# Patient Record
Sex: FEMALE
[De-identification: names, ages, dates, MRNs, and addresses within clinical notes are randomized per-mention and may not be internally consistent; named-entity substitution may affect disease eponyms.]

---

## 2024-09-12 ENCOUNTER — NON-APPOINTMENT (OUTPATIENT)
Age: 39
End: 2024-09-12

## 2024-09-17 PROBLEM — Z00.00 ENCOUNTER FOR PREVENTIVE HEALTH EXAMINATION: Status: ACTIVE | Noted: 2024-09-17

## 2024-09-18 ENCOUNTER — APPOINTMENT (OUTPATIENT)
Dept: PULMONOLOGY | Facility: CLINIC | Age: 39
End: 2024-09-18

## 2024-10-03 ENCOUNTER — NON-APPOINTMENT (OUTPATIENT)
Age: 39
End: 2024-10-03

## 2024-10-04 ENCOUNTER — APPOINTMENT (OUTPATIENT)
Dept: PULMONOLOGY | Facility: CLINIC | Age: 39
End: 2024-10-04
Payer: COMMERCIAL

## 2024-10-04 ENCOUNTER — NON-APPOINTMENT (OUTPATIENT)
Age: 39
End: 2024-10-04

## 2024-10-04 VITALS
SYSTOLIC BLOOD PRESSURE: 115 MMHG | HEART RATE: 69 BPM | DIASTOLIC BLOOD PRESSURE: 81 MMHG | WEIGHT: 138 LBS | HEIGHT: 66 IN | TEMPERATURE: 98.8 F | OXYGEN SATURATION: 97 % | BODY MASS INDEX: 22.18 KG/M2 | RESPIRATION RATE: 12 BRPM

## 2024-10-04 DIAGNOSIS — R06.00 DYSPNEA, UNSPECIFIED: ICD-10-CM

## 2024-10-04 DIAGNOSIS — R05.3 CHRONIC COUGH: ICD-10-CM

## 2024-10-04 DIAGNOSIS — K21.9 GASTRO-ESOPHAGEAL REFLUX DISEASE W/OUT ESOPHAGITIS: ICD-10-CM

## 2024-10-04 PROCEDURE — 99204 OFFICE O/P NEW MOD 45 MIN: CPT

## 2024-10-04 RX ORDER — FAMOTIDINE 40 MG/1
40 TABLET, FILM COATED ORAL
Qty: 30 | Refills: 5 | Status: DISCONTINUED | COMMUNITY
Start: 2024-10-04 | End: 2024-10-04

## 2024-10-04 RX ORDER — FLUTICASONE PROPIONATE 50 UG/1
50 SPRAY, METERED NASAL TWICE DAILY
Qty: 1 | Refills: 6 | Status: ACTIVE | COMMUNITY
Start: 2024-10-04 | End: 1900-01-01

## 2024-10-04 RX ORDER — FAMOTIDINE 20 MG/1
20 TABLET, FILM COATED ORAL
Qty: 30 | Refills: 6 | Status: ACTIVE | COMMUNITY
Start: 2024-10-04 | End: 1900-01-01

## 2024-10-31 ENCOUNTER — NON-APPOINTMENT (OUTPATIENT)
Age: 39
End: 2024-10-31

## 2024-11-11 ENCOUNTER — TRANSCRIPTION ENCOUNTER (OUTPATIENT)
Age: 39
End: 2024-11-11

## 2024-11-11 ENCOUNTER — APPOINTMENT (OUTPATIENT)
Dept: SURGERY | Facility: CLINIC | Age: 39
End: 2024-11-11
Payer: COMMERCIAL

## 2024-11-11 VITALS
OXYGEN SATURATION: 98 % | TEMPERATURE: 98.1 F | HEIGHT: 66 IN | HEART RATE: 72 BPM | WEIGHT: 137 LBS | DIASTOLIC BLOOD PRESSURE: 79 MMHG | SYSTOLIC BLOOD PRESSURE: 115 MMHG | BODY MASS INDEX: 22.02 KG/M2

## 2024-11-11 DIAGNOSIS — Z86.59 PERSONAL HISTORY OF OTHER MENTAL AND BEHAVIORAL DISORDERS: ICD-10-CM

## 2024-11-11 DIAGNOSIS — K80.10 CALCULUS OF GALLBLADDER WITH CHRONIC CHOLECYSTITIS W/OUT OBSTRUCTION: ICD-10-CM

## 2024-11-11 DIAGNOSIS — N13.30 UNSPECIFIED HYDRONEPHROSIS: ICD-10-CM

## 2024-11-11 PROCEDURE — G2211 COMPLEX E/M VISIT ADD ON: CPT | Mod: NC

## 2024-11-11 PROCEDURE — 99204 OFFICE O/P NEW MOD 45 MIN: CPT

## 2024-11-11 RX ORDER — SPIRONOLACTONE 100 MG/1
100 TABLET ORAL
Qty: 30 | Refills: 0 | Status: ACTIVE | COMMUNITY
Start: 2024-10-08

## 2024-11-18 ENCOUNTER — APPOINTMENT (OUTPATIENT)
Dept: PULMONOLOGY | Facility: CLINIC | Age: 39
End: 2024-11-18

## 2024-12-04 DIAGNOSIS — Z01.812 ENCOUNTER FOR PREPROCEDURAL LABORATORY EXAMINATION: ICD-10-CM

## 2024-12-06 ENCOUNTER — APPOINTMENT (OUTPATIENT)
Dept: FAMILY MEDICINE | Facility: CLINIC | Age: 39
End: 2024-12-06
Payer: COMMERCIAL

## 2024-12-06 VITALS
WEIGHT: 137 LBS | HEART RATE: 69 BPM | HEIGHT: 66 IN | DIASTOLIC BLOOD PRESSURE: 80 MMHG | OXYGEN SATURATION: 96 % | BODY MASS INDEX: 22.02 KG/M2 | SYSTOLIC BLOOD PRESSURE: 110 MMHG | TEMPERATURE: 98.5 F

## 2024-12-06 DIAGNOSIS — K80.10 CALCULUS OF GALLBLADDER WITH CHRONIC CHOLECYSTITIS W/OUT OBSTRUCTION: ICD-10-CM

## 2024-12-06 DIAGNOSIS — K21.9 GASTRO-ESOPHAGEAL REFLUX DISEASE W/OUT ESOPHAGITIS: ICD-10-CM

## 2024-12-06 DIAGNOSIS — Z87.898 PERSONAL HISTORY OF OTHER SPECIFIED CONDITIONS: ICD-10-CM

## 2024-12-06 PROCEDURE — 99204 OFFICE O/P NEW MOD 45 MIN: CPT

## 2024-12-06 PROCEDURE — 36415 COLL VENOUS BLD VENIPUNCTURE: CPT

## 2024-12-06 PROCEDURE — 93000 ELECTROCARDIOGRAM COMPLETE: CPT

## 2024-12-06 PROCEDURE — G2211 COMPLEX E/M VISIT ADD ON: CPT | Mod: NC

## 2024-12-10 ENCOUNTER — NON-APPOINTMENT (OUTPATIENT)
Age: 39
End: 2024-12-10

## 2024-12-10 LAB
ALBUMIN SERPL ELPH-MCNC: 4.6 G/DL
ALP BLD-CCNC: 60 U/L
ALT SERPL-CCNC: 10 U/L
ANION GAP SERPL CALC-SCNC: 12 MMOL/L
APPEARANCE: CLEAR
APTT BLD: 31.5 SEC
AST SERPL-CCNC: 17 U/L
BILIRUB SERPL-MCNC: 0.6 MG/DL
BILIRUBIN URINE: NEGATIVE
BLOOD URINE: NEGATIVE
BUN SERPL-MCNC: 11 MG/DL
CALCIUM SERPL-MCNC: 9.8 MG/DL
CHLORIDE SERPL-SCNC: 102 MMOL/L
CO2 SERPL-SCNC: 25 MMOL/L
COLOR: YELLOW
CREAT SERPL-MCNC: 0.68 MG/DL
EGFR: 114 ML/MIN/1.73M2
ESTIMATED AVERAGE GLUCOSE: 100 MG/DL
GLUCOSE QUALITATIVE U: NEGATIVE MG/DL
GLUCOSE SERPL-MCNC: 83 MG/DL
HBA1C MFR BLD HPLC: 5.1 %
HCG SERPL-MCNC: <1 MIU/ML
HCT VFR BLD CALC: 38.6 %
HGB BLD-MCNC: 12.2 G/DL
INR PPP: 1 RATIO
KETONES URINE: NEGATIVE MG/DL
LEUKOCYTE ESTERASE URINE: NEGATIVE
MCHC RBC-ENTMCNC: 30.9 PG
MCHC RBC-ENTMCNC: 31.6 G/DL
MCV RBC AUTO: 97.7 FL
NITRITE URINE: NEGATIVE
PH URINE: 7
PLATELET # BLD AUTO: 298 K/UL
POTASSIUM SERPL-SCNC: 4.6 MMOL/L
PROT SERPL-MCNC: 7.4 G/DL
PROTEIN URINE: NEGATIVE MG/DL
PT BLD: 11.9 SEC
RBC # BLD: 3.95 M/UL
RBC # FLD: 12.9 %
SODIUM SERPL-SCNC: 139 MMOL/L
SPECIFIC GRAVITY URINE: 1.01
UROBILINOGEN URINE: 0.2 MG/DL
WBC # FLD AUTO: 6.08 K/UL

## 2024-12-16 ENCOUNTER — NON-APPOINTMENT (OUTPATIENT)
Age: 39
End: 2024-12-16

## 2024-12-16 RX ORDER — CHLORHEXIDINE GLUCONATE 1.2 MG/ML
1 RINSE ORAL DAILY
Refills: 0 | Status: DISCONTINUED | OUTPATIENT
Start: 2024-12-17 | End: 2024-12-17

## 2024-12-16 NOTE — ASU PATIENT PROFILE, ADULT - NS PREOP UNDERSTANDS INFO
yes NO solid food for 8 hours before surgery/ no chewing gums or hard candy while fasting/ wear loose fitting comfortable clothes/yes

## 2024-12-16 NOTE — ASU PATIENT PROFILE, ADULT - ACCEPTABLE
Subjective   Patient ID: Connor is a 77 year old male.    Patient Care Team:  Aura Novoa DO as PCP - General (Family Practice)    Current listed medical conditions/medical history:  Patient Active Problem List   Diagnosis   • Hyperlipidemia   • DM (diabetes mellitus) (CMD)   • Meningioma (CMD)   • Moderate major depression (CMD)   • Benign prostatic hyperplasia   • Essential hypertension     SUBJECTIVE:  Chief Complaint   Patient presents with   • Follow-up     Connor is a 77 year old male with history of diabetes, dyslipidemia, BPH, hypertension, chronic constipation, arthritis, depression, meningioma, who presents for MWV with RN and follow up on his chronic conditions.    He is on metformin 1000 mg BID and glipizide 5 mg daily for his diabetes. He is scheduled to see ophthalmology next month (Dr. Siegle).      He is on lipitor 40 mg daily for cholesterol.    He is on flomax for BPH.     He is on amlodipine 10 mg daily, coreg 25 mg BID, lisinopril 40 mg daily for blood pressure. He was recently increased on Lisinopril 40 mg daily for better blood pressure control. He admits to checking his BP at home and it ranges 130s systolic.    He was seen in the clinic on 11/6 for allergic reaction s/p shampoo. He was given IM kenalog and clobetasol ointment. He states clobetasol was not covered by insurance. He has noticed a significant improvement to his rash. He does have occasion itching on his hands bilaterally.     He was diagnosed with meningioma when in the ED on 10/21 and was advised to see neurosurgery/neurosugeon. He continues to have headaches and neck pain but it's improved. He is scheduled to see neuro on 11/14.    He was given referral to ENT for cough and sore throat/burning sensation. He was advised to continue protonix for possible GERD. He stated insurance did not cover medication last time.    Relationships:      History obtained from: patient and electronic medical record    No LMP for male  patient.    Recent PHQ 2/9 Score    PHQ 2:  PHQ 2 Score Adult PHQ 2 Score Adult PHQ 2 Interpretation Little interest or pleasure in activity?   12/9/2022   4:37 PM 5 Further screening needed 2       PHQ 9:  PHQ 9 Score Adult PHQ 9 Score Adult PHQ 9 Interpretation   12/9/2022   4:37 PM 5 Mild Depression     Last four GAD7 Assessments       No data to display                Current Outpatient Medications   Medication Sig Dispense Refill   • pantoprazole (PROTONIX) 40 MG tablet Take 1 tablet by mouth daily. 90 tablet 1   • triamcinolone (ARISTOCORT) 0.1 % ointment Apply 1 application. topically in the morning and 1 application. in the evening. 30 g 0   • ondansetron (ZOFRAN ODT) 4 MG disintegrating tablet Place 1 tablet onto the tongue every 8 hours as needed for Nausea. 30 tablet 0   • clobetasol (TEMOVATE) 0.05 % ointment Apply topically 2 times daily. 60 g 0   • lisinopril (ZESTRIL) 40 MG tablet Take 1 tablet by mouth daily. 90 tablet 1   • Blood Glucose Monitoring Suppl w/Device Kit Please provide glucometer that is covered by her insurance. 1 kit 1   • Lancets 28G Misc Patient to use once daily. 100 each 3   • blood glucose test strip Test blood sugar daily. Diagnosis: DMT2. Please provide strips compatible with glucometer. 300 strip 1   • Respiratory Therapy Supplies (Nebulizer/Tubing/Mouthpiece) Kit Use once daily as needed 1 kit 1   • albuterol 108 (90 Base) MCG/ACT inhaler Inhale 2 puffs into the lungs every 4 hours as needed for Shortness of Breath or Wheezing. 1 each 0   • albuterol (ACCUNEB) 1.25 MG/3ML nebulizer solution Take 3 mLs by nebulization every 6 hours as needed for Wheezing or Shortness of Breath. 75 mL 1   • azithromycin (Zithromax Z-Vlad) 250 MG tablet Take 2 tablets (500 mg) by mouth x 1 day then 1 tablet (250 mg) by mouth daily x 4 days. 6 tablet 0   • polyethylene glycol (MIRALAX) 17 g packet Take 17 g by mouth daily. Stir and dissolve powder in any 4 to 8 ounces of beverage, then drink. 10  packet 0   • OneTouch Delica Lancets 33G Misc Use lancet twice daily to check glucose level. 300 each 0   • blood glucose test strip by Other route 3 times daily. Test blood sugar 3 times daily. Diagnosis: E11.9 . Meter: OneTouch Delica 100 each 1   • amLODIPine (NORVASC) 10 MG tablet Take 1 tablet by mouth daily. 90 tablet 0   • atorvastatin (LIPITOR) 40 MG tablet Take 1 tablet by mouth daily. 90 tablet 0   • carvedilol (COREG) 25 MG tablet Take 1 tablet by mouth in the morning and 1 tablet in the evening. 180 tablet 0   • glipiZIDE (GLUCOTROL XL) 5 MG 24 hr tablet Take 1 tablet by mouth daily. 90 tablet 0   • linaclotide (Linzess) 290 MCG Take 1 capsule by mouth daily (before breakfast). 90 capsule 0   • metformin (GLUCOPHAGE) 1000 MG tablet Take 1 tablet by mouth in the morning and 1 tablet in the evening. Take with meals. 180 tablet 0   • repaglinide (PRANDIN) 0.5 MG tablet Take 1 tablet by mouth in the morning and 1 tablet at noon and 1 tablet in the evening. Take before meals. 270 tablet 0   • tamsulosin (FLOMAX) 0.4 MG Cap Take 1 capsule by mouth daily. 90 capsule 0   • Blood Glucose Monitoring Suppl (OneTouch Verio) w/Device Kit 1 each  in the morning and 1 each in the evening. 1 kit 0   • typhoid (Typhim VI) 25 MCG/0.5ML injectable solution Typhim VI 25 mcg/0.5 mL intramuscular syringe   ADMINISTER VACCINE PER PHYSICIAN PROTOCOL       No current facility-administered medications for this visit.     Patient's medications, allergies, past medical, surgical, social and family histories were reviewed and updated as appropriate.  Past Medical History:   Diagnosis Date   • Arthritis    • Constipation    • Diabetes mellitus (CMD)    • Enlarged prostate    • High cholesterol      Past Surgical History:   Procedure Laterality Date   • Cardiac surgery  2016   • Esophagogastroduodenoscopy transoral flex w/bx single or mult  2021     Social History     Socioeconomic History   • Marital status: Single     Spouse name:  Not on file   • Number of children: Not on file   • Years of education: Not on file   • Highest education level: Not on file   Occupational History   • Not on file   Tobacco Use   • Smoking status: Former     Current packs/day: 0.00     Types: Cigarettes     Start date:      Quit date:      Years since quittin.8   • Smokeless tobacco: Never   Vaping Use   • Vaping Use: never used   Substance and Sexual Activity   • Alcohol use: Never   • Drug use: Never   • Sexual activity: Not Currently   Other Topics Concern   • Not on file   Social History Narrative   • Not on file     Social Determinants of Health     Financial Resource Strain: Not on file   Food Insecurity: Not on file   Transportation Needs: Not on file   Physical Activity: Not on file   Stress: Not on file   Social Connections: Not on file   Intimate Partner Violence: Not on file     Family History   Problem Relation Age of Onset   • Diabetes Mother    • Aneurysm Mother    • Patient is unaware of any medical problems Father    • Patient is unaware of any medical problems Sister    • Patient is unaware of any medical problems Sister    • Patient is unaware of any medical problems Sister    • Patient is unaware of any medical problems Brother    • Patient is unaware of any medical problems Brother    • Cancer Brother         brain   • Patient is unaware of any medical problems Brother    • Patient is unaware of any medical problems Brother    • Diabetes Brother    • Hypertension Brother    • Patient is unaware of any medical problems Maternal Grandmother    • Patient is unaware of any medical problems Maternal Grandfather    • Patient is unaware of any medical problems Paternal Grandmother    • Patient is unaware of any medical problems Paternal Grandfather    • Patient is unaware of any medical problems Daughter    • Patient is unaware of any medical problems Son       Health Maintenance Summary     Diabetes Eye Exam (Yearly)  Ordered on  11/10/2023    Hepatitis C Screening (Once)  Ordered on 11/10/2023    Medicare Advantage- Medicare Wellness Visit (Yearly - January to December)  Overdue since 1/1/2023    Diabetes A1C (Every 6 Months)  Next due on 4/25/2024    DM/CKD GFR (Yearly)  Next due on 10/21/2024    Diabetes Foot Exam (Yearly)  Next due on 10/25/2024    DTaP/Tdap/Td Vaccine (2 - Td or Tdap)  Next due on 2/11/2028    Shingles Vaccine   Completed    Pneumococcal Vaccine 65+   Completed    Meningococcal Vaccine   Aged Out    Influenza Vaccine   Completed    COVID-19 Vaccine   Completed    Hepatitis B Vaccine (For Physician/APC Discussion)   Aged Out    HPV Vaccine   Aged Out        ROS:  CONSTITUTIONAL: Denies fever  EYES: Denies double vision  ENT: +sore throat  CV: Denies chest pain  RESPIRATORY: +night-time cough  SKIN: rash- improving    OBJECTIVE:  Vitals:    11/10/23 1026   BP: 100/50   Pulse: 70   Resp: 18   Temp: 97.9 °F (36.6 °C)   TempSrc: Temporal   SpO2: 96%   Weight: 81.6 kg (180 lb)   Height: 6' (1.829 m)   PainSc: 5    PainLoc: Throat     Physical Exam:  GENERAL: awake, alert and oriented, no acute distress.  HEENT: normocephalic, atraumatic, EOMI  CV: regular rate and rhythm, S1, S2  RESP: clear to auscultation bilaterally   SKIN: +erythema on bilateral hands with itching- improved  MSK/EXT: moves all 4 extremities   NEURO: no focal deficits  PSYCH: affect appropriate    ASSESSMENT/PLAN:  Connor was seen today for follow-up.  Diagnoses and all orders for this visit:    Essential hypertension  -Continue amlodipine 10 mg daily, coreg 25 mg BID, lisinopril 40 mg daily    Hyperlipidemia, unspecified hyperlipidemia type  -Continue lipitor 40 mg nightly    Type 2 diabetes mellitus without complication, without long-term current use of insulin (CMD)  -Continue metformin 1000 mg BID and glipizide 5 mg daily  -Hgb a1c 7.7 on 10/25/2023  -SERVICE TO OPHTHALMOLOGY- patient is scheduled with Dr. Siegle next month    Meningioma (CMD)  -He is  scheduled to see neuro on 11/14.    Benign prostatic hyperplasia, unspecified whether lower urinary tract symptoms present  -Continue flomax daily    Cough, unspecified type  Excessive gas  GERD  -     pantoprazole (PROTONIX) 40 MG tablet; Take 1 tablet by mouth daily.    Allergic reaction, subsequent encounter  -     triamcinolone (ARISTOCORT) 0.1 % ointment; Apply 1 application. topically in the morning and 1 application. in the evening.    I reviewed the diagnosis and plan with the patient, and all questions were answered.  The medication list was reviewed.  The patient understands to call the office for any new questions or concerns.    Aura Novoa DO  11/10/2023   11:08 AM    4

## 2024-12-17 ENCOUNTER — TRANSCRIPTION ENCOUNTER (OUTPATIENT)
Age: 39
End: 2024-12-17

## 2024-12-17 ENCOUNTER — APPOINTMENT (OUTPATIENT)
Dept: SURGERY | Facility: AMBULATORY SURGERY CENTER | Age: 39
End: 2024-12-17

## 2024-12-17 ENCOUNTER — OUTPATIENT (OUTPATIENT)
Dept: OUTPATIENT SERVICES | Facility: HOSPITAL | Age: 39
LOS: 1 days | Discharge: HOME | End: 2024-12-17
Payer: COMMERCIAL

## 2024-12-17 ENCOUNTER — RESULT REVIEW (OUTPATIENT)
Age: 39
End: 2024-12-17

## 2024-12-17 ENCOUNTER — INPATIENT (INPATIENT)
Facility: HOSPITAL | Age: 39
LOS: 0 days | Discharge: HOME HEALTH | End: 2024-12-18
Attending: SURGERY | Admitting: SURGERY
Payer: COMMERCIAL

## 2024-12-17 VITALS
OXYGEN SATURATION: 96 % | RESPIRATION RATE: 18 BRPM | DIASTOLIC BLOOD PRESSURE: 81 MMHG | SYSTOLIC BLOOD PRESSURE: 116 MMHG | TEMPERATURE: 98 F | HEART RATE: 71 BPM

## 2024-12-17 VITALS
HEIGHT: 66 IN | SYSTOLIC BLOOD PRESSURE: 108 MMHG | WEIGHT: 137.13 LBS | HEART RATE: 65 BPM | RESPIRATION RATE: 18 BRPM | TEMPERATURE: 98 F | OXYGEN SATURATION: 100 % | DIASTOLIC BLOOD PRESSURE: 65 MMHG

## 2024-12-17 VITALS
RESPIRATION RATE: 12 BRPM | DIASTOLIC BLOOD PRESSURE: 76 MMHG | SYSTOLIC BLOOD PRESSURE: 116 MMHG | HEART RATE: 69 BPM | OXYGEN SATURATION: 93 %

## 2024-12-17 DIAGNOSIS — Z98.890 OTHER SPECIFIED POSTPROCEDURAL STATES: Chronic | ICD-10-CM

## 2024-12-17 DIAGNOSIS — K42.9 UMBILICAL HERNIA W/OUT OBSTRUCTION OR GANGRENE: ICD-10-CM

## 2024-12-17 PROCEDURE — S2900 ROBOTIC SURGICAL SYSTEM: CPT | Mod: NC

## 2024-12-17 PROCEDURE — 47562 LAPAROSCOPIC CHOLECYSTECTOMY: CPT | Mod: AS

## 2024-12-17 PROCEDURE — 47562 LAPAROSCOPIC CHOLECYSTECTOMY: CPT

## 2024-12-17 PROCEDURE — 88304 TISSUE EXAM BY PATHOLOGIST: CPT | Mod: 26

## 2024-12-17 DEVICE — LIGATING CLIPS WECK HEMOLOK POLYMER MEDIUM-LARGE (GREEN) 6: Type: IMPLANTABLE DEVICE | Status: FUNCTIONAL

## 2024-12-17 RX ORDER — APREPITANT 40 MG/1
40 CAPSULE ORAL ONCE
Refills: 0 | Status: COMPLETED | OUTPATIENT
Start: 2024-12-17 | End: 2024-12-17

## 2024-12-17 RX ORDER — HYDROMORPHONE HYDROCHLORIDE 2 MG/1
0.25 TABLET ORAL EVERY 6 HOURS
Refills: 0 | Status: DISCONTINUED | OUTPATIENT
Start: 2024-12-17 | End: 2024-12-18

## 2024-12-17 RX ORDER — DOCUSATE SODIUM 100 MG
1 CAPSULE ORAL
Qty: 10 | Refills: 0
Start: 2024-12-17

## 2024-12-17 RX ORDER — ACETAMINOPHEN 500MG 500 MG/1
1000 TABLET, COATED ORAL ONCE
Refills: 0 | Status: COMPLETED | OUTPATIENT
Start: 2024-12-17 | End: 2024-12-17

## 2024-12-17 RX ORDER — OXYCODONE HYDROCHLORIDE 30 MG/1
5 TABLET ORAL EVERY 6 HOURS
Refills: 0 | Status: DISCONTINUED | OUTPATIENT
Start: 2024-12-17 | End: 2024-12-18

## 2024-12-17 RX ORDER — ONDANSETRON HYDROCHLORIDE 4 MG/1
4 TABLET, FILM COATED ORAL EVERY 6 HOURS
Refills: 0 | Status: DISCONTINUED | OUTPATIENT
Start: 2024-12-17 | End: 2024-12-18

## 2024-12-17 RX ORDER — METHOCARBAMOL 500 MG/1
750 TABLET, FILM COATED ORAL EVERY 6 HOURS
Refills: 0 | Status: DISCONTINUED | OUTPATIENT
Start: 2024-12-17 | End: 2024-12-18

## 2024-12-17 RX ORDER — KETOROLAC TROMETHAMINE 30 MG/ML
30 INJECTION INTRAMUSCULAR; INTRAVENOUS ONCE
Refills: 0 | Status: DISCONTINUED | OUTPATIENT
Start: 2024-12-17 | End: 2024-12-17

## 2024-12-17 RX ORDER — OXYCODONE HYDROCHLORIDE 30 MG/1
2.5 TABLET ORAL EVERY 6 HOURS
Refills: 0 | Status: DISCONTINUED | OUTPATIENT
Start: 2024-12-17 | End: 2024-12-18

## 2024-12-17 RX ORDER — OXYCODONE HYDROCHLORIDE 30 MG/1
5 TABLET ORAL ONCE
Refills: 0 | Status: DISCONTINUED | OUTPATIENT
Start: 2024-12-17 | End: 2024-12-17

## 2024-12-17 RX ORDER — DIAZEPAM 10 MG/1
2.5 TABLET ORAL ONCE
Refills: 0 | Status: DISCONTINUED | OUTPATIENT
Start: 2024-12-17 | End: 2024-12-17

## 2024-12-17 RX ORDER — HEPARIN SODIUM,PORCINE 1000/ML
5000 VIAL (ML) INJECTION EVERY 8 HOURS
Refills: 0 | Status: DISCONTINUED | OUTPATIENT
Start: 2024-12-17 | End: 2024-12-18

## 2024-12-17 RX ORDER — ONDANSETRON HYDROCHLORIDE 4 MG/1
4 TABLET, FILM COATED ORAL ONCE
Refills: 0 | Status: DISCONTINUED | OUTPATIENT
Start: 2024-12-17 | End: 2024-12-17

## 2024-12-17 RX ORDER — ACETAMINOPHEN 500MG 500 MG/1
1000 TABLET, COATED ORAL EVERY 6 HOURS
Refills: 0 | Status: DISCONTINUED | OUTPATIENT
Start: 2024-12-17 | End: 2024-12-18

## 2024-12-17 RX ORDER — KETOROLAC TROMETHAMINE 30 MG/ML
15 INJECTION INTRAMUSCULAR; INTRAVENOUS EVERY 6 HOURS
Refills: 0 | Status: DISCONTINUED | OUTPATIENT
Start: 2024-12-18 | End: 2024-12-18

## 2024-12-17 RX ORDER — HYDROMORPHONE HYDROCHLORIDE 2 MG/1
0.5 TABLET ORAL ONCE
Refills: 0 | Status: DISCONTINUED | OUTPATIENT
Start: 2024-12-17 | End: 2024-12-17

## 2024-12-17 RX ORDER — OXYCODONE HYDROCHLORIDE 30 MG/1
1 TABLET ORAL
Qty: 5 | Refills: 0
Start: 2024-12-17

## 2024-12-17 RX ORDER — 0.9 % SODIUM CHLORIDE 0.9 %
1000 INTRAVENOUS SOLUTION INTRAVENOUS
Refills: 0 | Status: DISCONTINUED | OUTPATIENT
Start: 2024-12-17 | End: 2024-12-17

## 2024-12-17 RX ADMIN — HYDROMORPHONE HYDROCHLORIDE 0.5 MILLIGRAM(S): 2 TABLET ORAL at 18:08

## 2024-12-17 RX ADMIN — DIAZEPAM 2.5 MILLIGRAM(S): 10 TABLET ORAL at 21:38

## 2024-12-17 RX ADMIN — KETOROLAC TROMETHAMINE 30 MILLIGRAM(S): 30 INJECTION INTRAMUSCULAR; INTRAVENOUS at 18:08

## 2024-12-17 RX ADMIN — KETOROLAC TROMETHAMINE 30 MILLIGRAM(S): 30 INJECTION INTRAMUSCULAR; INTRAVENOUS at 18:06

## 2024-12-17 RX ADMIN — ACETAMINOPHEN 500MG 1000 MILLIGRAM(S): 500 TABLET, COATED ORAL at 13:43

## 2024-12-17 RX ADMIN — OXYCODONE HYDROCHLORIDE 5 MILLIGRAM(S): 30 TABLET ORAL at 17:44

## 2024-12-17 RX ADMIN — METHOCARBAMOL 750 MILLIGRAM(S): 500 TABLET, FILM COATED ORAL at 23:36

## 2024-12-17 RX ADMIN — APREPITANT 40 MILLIGRAM(S): 40 CAPSULE ORAL at 13:43

## 2024-12-17 RX ADMIN — ACETAMINOPHEN 500MG 400 MILLIGRAM(S): 500 TABLET, COATED ORAL at 18:53

## 2024-12-17 RX ADMIN — HYDROMORPHONE HYDROCHLORIDE 0.5 MILLIGRAM(S): 2 TABLET ORAL at 17:25

## 2024-12-17 RX ADMIN — Medication 5000 UNIT(S): at 23:41

## 2024-12-17 RX ADMIN — ACETAMINOPHEN 500MG 1000 MILLIGRAM(S): 500 TABLET, COATED ORAL at 23:31

## 2024-12-17 RX ADMIN — OXYCODONE HYDROCHLORIDE 5 MILLIGRAM(S): 30 TABLET ORAL at 18:53

## 2024-12-17 NOTE — ASU DISCHARGE PLAN (ADULT/PEDIATRIC) - NS MD DC FALL RISK RISK
For information on Fall & Injury Prevention, visit: [email or web address] OR  [email or web address] OR  https://www.cdc.gov/steadi/patient.html

## 2024-12-17 NOTE — ASU DISCHARGE PLAN (ADULT/PEDIATRIC) - FINANCIAL ASSISTANCE
[facility] provides services at a reduced cost to those who are determined to be eligible through [facility]’s financial assistance program. Information regarding [facility]’s financial assistance program can be found by going to [email or web address] or by calling [phone].

## 2024-12-17 NOTE — BRIEF OPERATIVE NOTE - NSICDXBRIEFPREOP_GEN_ALL_CORE_FT
PRE-OP DIAGNOSIS:  Symptomatic cholelithiasis 17-Dec-2024 17:15:37  Betty Ruff  Cholelithiasis with chronic cholecystitis 17-Dec-2024 17:15:56  Betty Ruff  Umbilical hernia 17-Dec-2024 17:16:04  Betty Ruff

## 2024-12-17 NOTE — ASU PREOP CHECKLIST - SELECT TESTS ORDERED
UCG results negative today - from day of surgery. Results printed and in chart/BMP/CBC/PT/PTT/INR/UCG/EKG

## 2024-12-17 NOTE — ASU DISCHARGE PLAN (ADULT/PEDIATRIC) - CARE PROVIDER_API CALL
Marta Jenkins Virk  Surgery  [address] [place] [address], [address]  [place] [address]  Phone: [phone]  Fax: [phone]  Follow Up Time: 2 weeks

## 2024-12-17 NOTE — H&P ADULT - NSHPPHYSICALEXAM_GEN_ALL_CORE
Physical Exam:   GENERAL: NAD, lying in bed comfortably  HEAD:  Atraumatic, normocephalic  EYES: EOMI, PERRLA, conjunctiva and sclera clear  NECK: Supple, trachea midline, no JVD  HEART: Regular rate and rhythm  LUNGS: Unlabored respirations  ABDOMEN: soft, non distended, appropriately tender throughout, no rebound or guarding, surgical incisions are clean dry and intact  EXTREMITIES: warm extremities, no clubbing, cyanosis, or edema  NERVOUS SYSTEM:  A&Ox3, moving all extremities, no focal deficits   SKIN: No rashes or lesions

## 2024-12-17 NOTE — ASU PREOP CHECKLIST - HEIGHT IN INCHES
Patient Seen in: [facility] Emergency Department      History   Patient presents with:  Back Pain    Stated Complaint: back pain since March    HPI/Subjective:   HPI    Manny Ness is a pleasant 80-year-old male presenting with back pain.   This is pain t - Abnormal; Notable for the following components:       Result Value    BUN 23 (*)     BUN/CREA Ratio 20.4 (*)     Calculated Osmolality 302 (*)     GFR, Non- 58 (*)     All other components within normal limits   URINALYSIS WITH CULTURE RE 6 Pain.  Qty: 10 tablet Refills: 0

## 2024-12-17 NOTE — H&P ADULT - HISTORY OF PRESENT ILLNESS
40yo Female pt with PMHx of GERD, R hydronephrosis, and symptomatic cholelithiasis and now s/p RA cholecystectomy with umbilical hernia repair, today 12/17 with Dr. Jenkins presenting as a transfer from [facility] for post op pain management. Patient reports feeling muscle soreness in her lower abdomen and has pain in her RUQ that feels similar to when she has been to the ER for symptomatic cholelithiasis. Denies any fevers, chills, nausea or vomiting.     On arrival, pt afebrile, nontachycardic, normotensive, and satting on RA. On exam, patient is comfortable appearing, abdomen is soft, appropriately tender, no rebound or guarding, non distended, with surgical incisions clean, dry, and intact. No labs or imaging to be reviwed.    PMH: GERD, R hydroneprhosis, symptomatic cholelithiasis  PSHx: RA diandra w umbilical hernia repair (12/17 Dr. Jenkins)  Medications: spironolactone 100mg qd  Allergies: NKA  Social Hx: non smoker  Family Hx: Denies family hx of IBS, Crohn's, UC, or colon cancer.     38yo Female pt with PMHx of GERD, R hydronephrosis, and symptomatic cholelithiasis and now s/p RA cholecystectomy with umbilical hernia repair, today 12/17 with Dr. Jenkins presenting as a transfer from [facility] for post op pain management. Patient reports feeling muscle soreness in her lower abdomen and has pain in her RUQ that feels similar to when she has been to the ER for symptomatic cholelithiasis. Denies any fevers, chills, nausea or vomiting.     On arrival, pt afebrile, nontachycardic, normotensive, and satting on RA. On exam, patient is comfortable appearing, abdomen is soft, appropriately tender, no rebound or guarding, non distended, with surgical incisions clean, dry, and intact. No labs or imaging to be reviwed.    PMH: GERD, R hydroneprhosis, symptomatic cholelithiasis  PSHx: RA diandra w umbilical hernia repair (12/17 Dr. Jenkins)  Medications: spironolactone 100mg qd  Allergies: NKA  Social Hx: non smoker     38yo Female pt with PMHx of GERD, R hydronephrosis, and symptomatic cholelithiasis and now s/p RA cholecystectomy with umbilical hernia repair, today 12/17 with Dr. Jenkins presenting as a transfer from [facility] for post op pain management. Patient reports feeling muscle soreness in her lower abdomen and has pain in her RUQ that feels similar to when she has been to the ER for symptomatic cholelithiasis. Denies any fevers, chills, nausea or vomiting.     On arrival, pt afebrile, nontachycardic, normotensive, and satting on RA. On exam, patient is comfortable appearing, abdomen is soft, appropriately tender, no rebound or guarding, non distended, with surgical incisions clean, dry, and intact. No labs or imaging to be reviwed.    PMH: GERD, R hydronephrosis, symptomatic cholelithiasis  PSHx: RA diandra w umbilical hernia repair (12/17 Dr. Jenkins)  Medications: spironolactone 100mg qd  Allergies: NKA  Social Hx: non smoker

## 2024-12-17 NOTE — BRIEF OPERATIVE NOTE - NSICDXBRIEFPREOP_GEN_ALL_CORE_FT
PRE-OP DIAGNOSIS:  Cholelithiasis with chronic cholecystitis 17-Dec-2024 17:22:14  Luis Marcus  Umbilical hernia 17-Dec-2024 17:16:04  Betty Ruff

## 2024-12-17 NOTE — H&P ADULT - ASSESSMENT
40yo Female pt with PMHx of GERD, R hydronephrosis, and symptomatic cholelithiasis and now s/p RA cholecystectomy with umbilical hernia repair, today 12/17 with Dr. Jenkins presenting as a transfer from [facility] for post op pain management. Patient admitted for pain management.    Low Fat diet  pain/nausea PRNs  SCD/IS/OOB/SQH  AM Labs

## 2024-12-17 NOTE — BRIEF OPERATIVE NOTE - NSICDXBRIEFPOSTOP_GEN_ALL_CORE_FT
POST-OP DIAGNOSIS:  Cholelithiasis with chronic cholecystitis 17-Dec-2024 17:16:18  Betty Ruff  Symptomatic cholelithiasis 17-Dec-2024 17:16:33  Betty Ruff  Umbilical hernia 17-Dec-2024 17:16:39  Betty Ruff  
POST-OP DIAGNOSIS:  Umbilical hernia 17-Dec-2024 17:22:33  Luis Marcus  Cholelithiasis with chronic cholecystitis 17-Dec-2024 17:16:18  Betty Ruff

## 2024-12-17 NOTE — ASU DISCHARGE PLAN (ADULT/PEDIATRIC) - ASU DC SPECIAL INSTRUCTIONSFT
Pain instructions:   Post-surgical pain is expected, take Tylenol 1000mg every 8 hours for the first few days after surgery with a maximum 24 hours dose of 4000mg, you can supplement this with Advil 400mg every 6-8 hours staggering them to have sufficient pain control. Take Oxycodone only if pain is severe.     You can also use alternate pain control adjuncts such as cooling pad. Do not apply cold directly on skin, have a barrier cloth in place.     General instruction:  - Mobilizing after surgery is important to prevent blood clots, mobilize as much as you can.     Incision instruction:   - Your incision has steri strips, the steri strips will fall off on their own in 10-14 days    Shower/Bathing  - You can shower 24 hours after surgery, pat the incision dry after showering, do not scrub the incision, allow the water to flow over the incisions   - Avoid soaking in bath tubs, hot tubs or swimming pools for a period of 2-3 weeks, till the incision has fully healed and closed     Laparoscopic Cholecystectomy   - Avoid lifting heavy things weighing greater than 15-20 pounds for 3-4 weeks after surgery to prevent hernia formation

## 2024-12-17 NOTE — BRIEF OPERATIVE NOTE - OPERATION/FINDINGS
Patient was prepped and draped in the usual sterile fashion. Time out done. Vertical periumbilical incision was made. Blunt dissection down to and through the level of the fascia. A 12mm robotic trocar was placed.  Abdomen was insufflated.  3 additional 8mm robotic trocars were placed under direct visualization. (DV). Patient placed in reverse Trendelenburg with right side up.  Robot was docked to the patient.  Gallbladder fundus retracted superiorly over dome of liver.  Gallbladder infundibulum retracted laterally towards RLQ exposing Calot’s triangle. Critical view of safety obtained. Cystic duct and artery skeletonized, clipped and divided. Hemostasis achieved. No leakage of bile from cystic duct stump. Gallbladder was taken off of the liver and was removed via a 10mm endocatch. Hemostasis was again ensured. Trocars removed under DV. Fascia closed with 0 vicryl. Umbilicus reattached with 0-Vicryl. Skin closed with 4-0 monocryl, Mastisol, steri-strips. and Bandaids. Patient was extubated and transported to recovery in stable condition.  See full dictation.  
Non-inflamed but adhered gall bladder to cystic plate with difficult dissection and multiple points of venous bleeding managed with cautery. Noted to have hemangioma (reported on prior imaging as well)

## 2024-12-17 NOTE — BRIEF OPERATIVE NOTE - NSICDXBRIEFPROCEDURE_GEN_ALL_CORE_FT
PROCEDURES:  Robot-assisted cholecystectomy 17-Dec-2024 17:14:39  Luis Marcus  Repair, hernia, umbilical, with lipectomy 17-Dec-2024 17:15:18  Luis Marcus

## 2024-12-17 NOTE — PRE-ANESTHESIA EVALUATION ADULT - NSANTHOSAYNRD_GEN_A_CORE
No. RENU screening performed.  STOP BANG Legend: 0-2 = LOW Risk; 3-4 = INTERMEDIATE Risk; 5-8 = HIGH Risk

## 2024-12-18 ENCOUNTER — TRANSCRIPTION ENCOUNTER (OUTPATIENT)
Age: 39
End: 2024-12-18

## 2024-12-18 VITALS — SYSTOLIC BLOOD PRESSURE: 111 MMHG | DIASTOLIC BLOOD PRESSURE: 73 MMHG

## 2024-12-18 LAB
ALBUMIN SERPL ELPH-MCNC: 4.1 G/DL — SIGNIFICANT CHANGE UP (ref 3.3–5)
ALP SERPL-CCNC: 52 U/L — SIGNIFICANT CHANGE UP (ref 40–120)
ALT FLD-CCNC: 37 U/L — SIGNIFICANT CHANGE UP (ref 10–45)
ANION GAP SERPL CALC-SCNC: 10 MMOL/L — SIGNIFICANT CHANGE UP (ref 5–17)
AST SERPL-CCNC: 43 U/L — HIGH (ref 10–40)
BILIRUB DIRECT SERPL-MCNC: 0.2 MG/DL — SIGNIFICANT CHANGE UP (ref 0–0.3)
BILIRUB INDIRECT FLD-MCNC: 0.5 MG/DL — SIGNIFICANT CHANGE UP (ref 0.2–1)
BILIRUB SERPL-MCNC: 0.7 MG/DL — SIGNIFICANT CHANGE UP (ref 0.2–1.2)
BUN SERPL-MCNC: 10 MG/DL — SIGNIFICANT CHANGE UP (ref 7–23)
CALCIUM SERPL-MCNC: 8.7 MG/DL — SIGNIFICANT CHANGE UP (ref 8.4–10.5)
CHLORIDE SERPL-SCNC: 102 MMOL/L — SIGNIFICANT CHANGE UP (ref 96–108)
CO2 SERPL-SCNC: 22 MMOL/L — SIGNIFICANT CHANGE UP (ref 22–31)
CREAT SERPL-MCNC: 0.56 MG/DL — SIGNIFICANT CHANGE UP (ref 0.5–1.3)
EGFR: 119 ML/MIN/1.73M2 — SIGNIFICANT CHANGE UP
GLUCOSE SERPL-MCNC: 123 MG/DL — HIGH (ref 70–99)
HCT VFR BLD CALC: 32.9 % — LOW (ref 34.5–45)
HGB BLD-MCNC: 11.3 G/DL — LOW (ref 11.5–15.5)
MAGNESIUM SERPL-MCNC: 1.7 MG/DL — SIGNIFICANT CHANGE UP (ref 1.6–2.6)
MCHC RBC-ENTMCNC: 31.1 PG — SIGNIFICANT CHANGE UP (ref 27–34)
MCHC RBC-ENTMCNC: 34.3 G/DL — SIGNIFICANT CHANGE UP (ref 32–36)
MCV RBC AUTO: 90.6 FL — SIGNIFICANT CHANGE UP (ref 80–100)
NRBC # BLD: 0 /100 WBCS — SIGNIFICANT CHANGE UP (ref 0–0)
PHOSPHATE SERPL-MCNC: 3.9 MG/DL — SIGNIFICANT CHANGE UP (ref 2.5–4.5)
PLATELET # BLD AUTO: 251 K/UL — SIGNIFICANT CHANGE UP (ref 150–400)
POTASSIUM SERPL-MCNC: 4.4 MMOL/L — SIGNIFICANT CHANGE UP (ref 3.5–5.3)
POTASSIUM SERPL-SCNC: 4.4 MMOL/L — SIGNIFICANT CHANGE UP (ref 3.5–5.3)
PROT SERPL-MCNC: 6.7 G/DL — SIGNIFICANT CHANGE UP (ref 6–8.3)
RBC # BLD: 3.63 M/UL — LOW (ref 3.8–5.2)
RBC # FLD: 12 % — SIGNIFICANT CHANGE UP (ref 10.3–14.5)
SODIUM SERPL-SCNC: 134 MMOL/L — LOW (ref 135–145)
WBC # BLD: 11.64 K/UL — HIGH (ref 3.8–10.5)
WBC # FLD AUTO: 11.64 K/UL — HIGH (ref 3.8–10.5)

## 2024-12-18 PROCEDURE — 80048 BASIC METABOLIC PNL TOTAL CA: CPT

## 2024-12-18 PROCEDURE — 85027 COMPLETE CBC AUTOMATED: CPT

## 2024-12-18 PROCEDURE — 36415 COLL VENOUS BLD VENIPUNCTURE: CPT

## 2024-12-18 PROCEDURE — 80076 HEPATIC FUNCTION PANEL: CPT

## 2024-12-18 PROCEDURE — 83735 ASSAY OF MAGNESIUM: CPT

## 2024-12-18 PROCEDURE — 84100 ASSAY OF PHOSPHORUS: CPT

## 2024-12-18 RX ORDER — OXYCODONE HYDROCHLORIDE 30 MG/1
1 TABLET ORAL
Qty: 9 | Refills: 0
Start: 2024-12-18 | End: 2024-12-20

## 2024-12-18 RX ORDER — DOCUSATE SODIUM 100 MG
1 CAPSULE ORAL
Qty: 8 | Refills: 0
Start: 2024-12-18 | End: 2024-12-21

## 2024-12-18 RX ADMIN — KETOROLAC TROMETHAMINE 15 MILLIGRAM(S): 30 INJECTION INTRAMUSCULAR; INTRAVENOUS at 11:06

## 2024-12-18 RX ADMIN — ACETAMINOPHEN 500MG 1000 MILLIGRAM(S): 500 TABLET, COATED ORAL at 11:06

## 2024-12-18 RX ADMIN — METHOCARBAMOL 750 MILLIGRAM(S): 500 TABLET, FILM COATED ORAL at 05:39

## 2024-12-18 RX ADMIN — OXYCODONE HYDROCHLORIDE 5 MILLIGRAM(S): 30 TABLET ORAL at 00:26

## 2024-12-18 RX ADMIN — ACETAMINOPHEN 500MG 1000 MILLIGRAM(S): 500 TABLET, COATED ORAL at 05:39

## 2024-12-18 RX ADMIN — KETOROLAC TROMETHAMINE 15 MILLIGRAM(S): 30 INJECTION INTRAMUSCULAR; INTRAVENOUS at 05:39

## 2024-12-18 RX ADMIN — Medication 5000 UNIT(S): at 05:39

## 2024-12-18 RX ADMIN — OXYCODONE HYDROCHLORIDE 5 MILLIGRAM(S): 30 TABLET ORAL at 01:53

## 2024-12-18 NOTE — DISCHARGE NOTE PROVIDER - CARE PROVIDER_API CALL
Marta Jenkins Virk  Surgery  [address] [place] [address], [address]  [place] [address]  Phone: [phone]  Fax: [phone]  Follow Up Time: 2 weeks

## 2024-12-18 NOTE — PROGRESS NOTE ADULT - SUBJECTIVE AND OBJECTIVE BOX
POST-OP DAY: X s/p      SUBJECTIVE: Patient seen and examined bedside by chief resident.    heparin   Injectable 5000 Unit(s) SubCutaneous every 8 hours    MEDICATIONS  (PRN):  HYDROmorphone  Injectable 0.25 milliGRAM(s) IV Push every 6 hours PRN Severe Pain (7 - 10)  ondansetron Injectable 4 milliGRAM(s) IV Push every 6 hours PRN Nausea  oxyCODONE    IR 2.5 milliGRAM(s) Oral every 6 hours PRN Moderate Pain (4 - 6)  oxyCODONE    IR 5 milliGRAM(s) Oral every 6 hours PRN Severe Pain (7 - 10)      I&O's Detail    17 Dec 2024 07:01  -  18 Dec 2024 06:14  --------------------------------------------------------  IN:  Total IN: 0 mL    OUT:    Voided (mL): 1250 mL  Total OUT: 1250 mL    Total NET: -1250 mL          Vital Signs Last 24 Hrs  T(C): 36.7 (18 Dec 2024 04:27), Max: 36.9 (17 Dec 2024 22:19)  T(F): 98 (18 Dec 2024 04:27), Max: 98.5 (17 Dec 2024 22:19)  HR: 67 (18 Dec 2024 04:27) (65 - 86)  BP: 130/76 (18 Dec 2024 04:27) (108/65 - 134/86)  BP(mean): --  RR: 18 (18 Dec 2024 04:27) (12 - 18)  SpO2: 99% (18 Dec 2024 04:27) (93% - 100%)    Parameters below as of 18 Dec 2024 04:27  Patient On (Oxygen Delivery Method): room air        General: NAD, resting comfortably in bed  C/V: NSR  Pulm: Nonlabored breathing, no respiratory distress  Abd: soft, NT/ND  Extrem: WWP, no edema, SCDs in place    LABS:                RADIOLOGY & ADDITIONAL STUDIES:

## 2024-12-18 NOTE — DISCHARGE NOTE NURSING/CASE MANAGEMENT/SOCIAL WORK - FINANCIAL ASSISTANCE
[facility] provides services at a reduced cost to those who are determined to be eligible through [facility]’s financial assistance program. Information regarding [facility]’s financial assistance program can be found by going to [email or web address] or by calling [phone].

## 2024-12-18 NOTE — DISCHARGE NOTE NURSING/CASE MANAGEMENT/SOCIAL WORK - PATIENT PORTAL LINK FT
You can access the FollowMyHealth Patient Portal offered by [facility] by registering at the following website: [email or web address]. By joining [employer]’s FollowMyHealth portal, you will also be able to view your health information using other applications (apps) compatible with our system.

## 2024-12-18 NOTE — DISCHARGE NOTE PROVIDER - NSDCMRMEDTOKEN_GEN_ALL_CORE_FT
Colace 100 mg oral capsule: 1 cap(s) orally 2 times a day Please take with oxycodone to prevent side effect of constipation from narcotics  oxyCODONE 5 mg oral tablet: 1 tab(s) orally every 8 hours as needed for  severe pain Please take for severe pain not controlled with Tylenol/NSAIDs. Please take with Colace. MDD: 20 mg per day  spironolactone 100 mg oral tablet: 1 tab(s) orally

## 2024-12-18 NOTE — PROGRESS NOTE ADULT - ASSESSMENT
40yo Female pt with PMHx of GERD, R hydronephrosis, and symptomatic cholelithiasis and now s/p RA cholecystectomy with umbilical hernia repair, today 12/17 with Dr. Jenkins presenting as a transfer from [facility] for post op pain management. Patient admitted for pain management    Low Fat diet  pain/nausea PRNs  SCD/IS/OOB/SQH  AM Labs

## 2024-12-18 NOTE — DISCHARGE NOTE PROVIDER - HOSPITAL COURSE
38yo Female pt with PMHx of GERD, R hydronephrosis, and symptomatic cholelithiasis and now s/p RA cholecystectomy with umbilical hernia repair 12/17 with Dr. Jenkins presenting as a transfer from [facility] for post op pain management. Patient admitted for pain management. Patient's pain is well controlled on oral regimen. She is tolerating diet and her exam has been soft, nontender, nondistended. Incisions appear to be  healing well. Patient's hospital course was uncomplicated. Diet is tolerated without nausea or vomiting Patient is ambulating without difficulty, voiding well, and having bowel function. This patient is deemed ready for discharge and will follow up with the surgeon in the outpatient setting and be sent home with oral pain control.

## 2024-12-18 NOTE — PROGRESS NOTE ADULT - ASSESSMENT
38yo Female pt with PMHx of GERD, R hydronephrosis, and symptomatic cholelithiasis and now s/p RA cholecystectomy with umbilical hernia repair 12/17 with Dr. Jenkins presenting as a transfer from [facility] for post op pain management. Patient admitted for pain management    Low Fat diet  pain/nausea PRNs  SCD/IS/OOB/SQH

## 2024-12-18 NOTE — PROGRESS NOTE ADULT - ATTENDING COMMENTS
Patient seen and examined at bedside.  Abdomen soft, appropriate incisional tenderness.  Dressings clean/dry/intact.    -Discharge home

## 2024-12-18 NOTE — PROGRESS NOTE ADULT - SUBJECTIVE AND OBJECTIVE BOX
SUBJECTIVE: Pt seen and examined by chief resident. Pt is doing well, resting comfortably on bed. Pain controlled on current regimen. Clear liquid diet tolerated. No nausea or vomiting. No complaints at this time.    Vital Signs Last 24 Hrs  T(C): 36.7 (18 Dec 2024 04:27), Max: 36.9 (17 Dec 2024 22:19)  T(F): 98 (18 Dec 2024 04:27), Max: 98.5 (17 Dec 2024 22:19)  HR: 67 (18 Dec 2024 04:27) (65 - 86)  BP: 130/76 (18 Dec 2024 04:27) (108/65 - 134/86)  BP(mean): --  RR: 18 (18 Dec 2024 04:27) (12 - 18)  SpO2: 99% (18 Dec 2024 04:27) (93% - 100%)    Parameters below as of 18 Dec 2024 04:27  Patient On (Oxygen Delivery Method): room air        I&O's Summary    17 Dec 2024 07:01  -  18 Dec 2024 07:00  --------------------------------------------------------  IN: 0 mL / OUT: 1250 mL / NET: -1250 mL        Physical Exam:  General Appearance: Appears well, NAD  Pulmonary: Nonlabored breathing, no respiratory distress  Abdomen: Soft, nondistended, nontender, incisions, dressings clean and dry and intact  Extremities: WWP, SCD's in place     LABS:

## 2024-12-18 NOTE — DISCHARGE NOTE PROVIDER - NSDCFUADDINST_GEN_ALL_CORE_FT
General Discharge Instructions:  Please resume all regular home medications unless specifically advised not to take a particular medication. Also, please take any new medications as prescribed.  Please get plenty of rest, continue to ambulate several times per day, and drink adequate amounts of fluids. Avoid lifting weights greater than 5-10 lbs until you follow-up with your surgeon, who will instruct you further regarding activity restrictions.  Avoid driving or operating heavy machinery while taking pain medications.  Please follow-up with your surgeon and Primary Care Provider (PCP) as advised.  Please follow up with Dr. Jenkins in 1-2 weeks by calling his office at  [phone]    Medications:   -Please take over the counter Tylenol (650mg-1000mg) every 6 hours for mild-moderate pain control. Do not exceed 4000mg of Tylenol daily.   -Please take Oxycodone 5mg every 6-8 hours as per needed for severe pain.  -Please take Colace to prevent constipation from Oxycodone. Please take if you are taking Oxycodone.      Incision Care:  *Please call your doctor or nurse practitioner if you have increased pain, swelling, redness, or drainage from the incision site.  *Avoid swimming and baths until your follow-up appointment.  *You may shower, and wash surgical incisions with a mild soap and warm water. Gently pat the area dry.    Warning Signs:  Please call your doctor or nurse practitioner if you experience the following:  *You experience new chest pain, pressure, squeezing or tightness.  *New or worsening cough, shortness of breath, or wheeze.  *If you are vomiting and cannot keep down fluids or your medications.  *You are getting dehydrated due to continued vomiting, diarrhea, or other reasons. Signs of dehydration include dry mouth, rapid heartbeat, or feeling dizzy or faint when standing.  *You see blood or dark/black material when you vomit or have a bowel movement.  *You experience burning when you urinate, have blood in your urine, or experience a discharge.  *Your pain is not improving within 8-12 hours or is not gone within 24 hours. Call or return immediately if your pain is getting worse, changes location, or moves to your chest or back.  *You have shaking chills, or fever greater than 101.5 degrees Fahrenheit or 38 degrees Celsius.  *Any change in your symptoms, or any new symptoms that concern you.

## 2024-12-21 PROBLEM — K21.9 GASTRO-ESOPHAGEAL REFLUX DISEASE WITHOUT ESOPHAGITIS: Chronic | Status: ACTIVE | Noted: 2024-12-17

## 2024-12-21 PROBLEM — N13.30 UNSPECIFIED HYDRONEPHROSIS: Chronic | Status: ACTIVE | Noted: 2024-12-17

## 2024-12-23 ENCOUNTER — EMERGENCY (EMERGENCY)
Facility: HOSPITAL | Age: 39
LOS: 1 days | Discharge: HOME | End: 2024-12-23
Attending: EMERGENCY MEDICINE | Admitting: EMERGENCY MEDICINE
Payer: COMMERCIAL

## 2024-12-23 ENCOUNTER — NON-APPOINTMENT (OUTPATIENT)
Age: 39
End: 2024-12-23

## 2024-12-23 VITALS
DIASTOLIC BLOOD PRESSURE: 74 MMHG | TEMPERATURE: 98 F | SYSTOLIC BLOOD PRESSURE: 115 MMHG | OXYGEN SATURATION: 98 % | HEART RATE: 66 BPM | RESPIRATION RATE: 16 BRPM

## 2024-12-23 VITALS
SYSTOLIC BLOOD PRESSURE: 118 MMHG | HEIGHT: 66 IN | TEMPERATURE: 98 F | OXYGEN SATURATION: 100 % | WEIGHT: 130.07 LBS | DIASTOLIC BLOOD PRESSURE: 83 MMHG | RESPIRATION RATE: 18 BRPM | HEART RATE: 65 BPM

## 2024-12-23 DIAGNOSIS — R10.11 RIGHT UPPER QUADRANT PAIN: ICD-10-CM

## 2024-12-23 DIAGNOSIS — Z98.890 OTHER SPECIFIED POSTPROCEDURAL STATES: Chronic | ICD-10-CM

## 2024-12-23 DIAGNOSIS — N13.2 HYDRONEPHROSIS WITH RENAL AND URETERAL CALCULOUS OBSTRUCTION: ICD-10-CM

## 2024-12-23 DIAGNOSIS — Z87.448 PERSONAL HISTORY OF OTHER DISEASES OF URINARY SYSTEM: ICD-10-CM

## 2024-12-23 DIAGNOSIS — Z98.890 OTHER SPECIFIED POSTPROCEDURAL STATES: ICD-10-CM

## 2024-12-23 DIAGNOSIS — K21.9 GASTRO-ESOPHAGEAL REFLUX DISEASE WITHOUT ESOPHAGITIS: ICD-10-CM

## 2024-12-23 DIAGNOSIS — R06.02 SHORTNESS OF BREATH: ICD-10-CM

## 2024-12-23 LAB
ALBUMIN SERPL ELPH-MCNC: 4.4 G/DL — SIGNIFICANT CHANGE UP (ref 3.3–5)
ALP SERPL-CCNC: 115 U/L — SIGNIFICANT CHANGE UP (ref 40–120)
ALT FLD-CCNC: 71 U/L — HIGH (ref 10–45)
ANION GAP SERPL CALC-SCNC: 10 MMOL/L — SIGNIFICANT CHANGE UP (ref 5–17)
APPEARANCE UR: ABNORMAL
AST SERPL-CCNC: 23 U/L — SIGNIFICANT CHANGE UP (ref 10–40)
BASOPHILS # BLD AUTO: 0.04 K/UL — SIGNIFICANT CHANGE UP (ref 0–0.2)
BASOPHILS NFR BLD AUTO: 0.5 % — SIGNIFICANT CHANGE UP (ref 0–2)
BILIRUB SERPL-MCNC: 0.4 MG/DL — SIGNIFICANT CHANGE UP (ref 0.2–1.2)
BILIRUB UR-MCNC: NEGATIVE — SIGNIFICANT CHANGE UP
BUN SERPL-MCNC: 11 MG/DL — SIGNIFICANT CHANGE UP (ref 7–23)
CALCIUM SERPL-MCNC: 9.1 MG/DL — SIGNIFICANT CHANGE UP (ref 8.4–10.5)
CHLORIDE SERPL-SCNC: 104 MMOL/L — SIGNIFICANT CHANGE UP (ref 96–108)
CO2 SERPL-SCNC: 25 MMOL/L — SIGNIFICANT CHANGE UP (ref 22–31)
COLOR SPEC: YELLOW — SIGNIFICANT CHANGE UP
CREAT SERPL-MCNC: 0.68 MG/DL — SIGNIFICANT CHANGE UP (ref 0.5–1.3)
DIFF PNL FLD: ABNORMAL
EGFR: 114 ML/MIN/1.73M2 — SIGNIFICANT CHANGE UP
EOSINOPHIL # BLD AUTO: 0.18 K/UL — SIGNIFICANT CHANGE UP (ref 0–0.5)
EOSINOPHIL NFR BLD AUTO: 2.2 % — SIGNIFICANT CHANGE UP (ref 0–6)
GLUCOSE SERPL-MCNC: 89 MG/DL — SIGNIFICANT CHANGE UP (ref 70–99)
GLUCOSE UR QL: NEGATIVE MG/DL — SIGNIFICANT CHANGE UP
HCT VFR BLD CALC: 36.3 % — SIGNIFICANT CHANGE UP (ref 34.5–45)
HGB BLD-MCNC: 12.6 G/DL — SIGNIFICANT CHANGE UP (ref 11.5–15.5)
IMM GRANULOCYTES NFR BLD AUTO: 0.4 % — SIGNIFICANT CHANGE UP (ref 0–0.9)
KETONES UR-MCNC: ABNORMAL MG/DL
LEUKOCYTE ESTERASE UR-ACNC: ABNORMAL
LIDOCAIN IGE QN: 28 U/L — SIGNIFICANT CHANGE UP (ref 7–60)
LYMPHOCYTES # BLD AUTO: 1.8 K/UL — SIGNIFICANT CHANGE UP (ref 1–3.3)
LYMPHOCYTES # BLD AUTO: 21.5 % — SIGNIFICANT CHANGE UP (ref 13–44)
MCHC RBC-ENTMCNC: 31 PG — SIGNIFICANT CHANGE UP (ref 27–34)
MCHC RBC-ENTMCNC: 34.7 G/DL — SIGNIFICANT CHANGE UP (ref 32–36)
MCV RBC AUTO: 89.2 FL — SIGNIFICANT CHANGE UP (ref 80–100)
MONOCYTES # BLD AUTO: 0.74 K/UL — SIGNIFICANT CHANGE UP (ref 0–0.9)
MONOCYTES NFR BLD AUTO: 8.8 % — SIGNIFICANT CHANGE UP (ref 2–14)
NEUTROPHILS # BLD AUTO: 5.58 K/UL — SIGNIFICANT CHANGE UP (ref 1.8–7.4)
NEUTROPHILS NFR BLD AUTO: 66.6 % — SIGNIFICANT CHANGE UP (ref 43–77)
NITRITE UR-MCNC: NEGATIVE — SIGNIFICANT CHANGE UP
NRBC # BLD: 0 /100 WBCS — SIGNIFICANT CHANGE UP (ref 0–0)
PH UR: 6 — SIGNIFICANT CHANGE UP (ref 5–8)
PLATELET # BLD AUTO: 299 K/UL — SIGNIFICANT CHANGE UP (ref 150–400)
POTASSIUM SERPL-MCNC: 4.4 MMOL/L — SIGNIFICANT CHANGE UP (ref 3.5–5.3)
POTASSIUM SERPL-SCNC: 4.4 MMOL/L — SIGNIFICANT CHANGE UP (ref 3.5–5.3)
PROT SERPL-MCNC: 7.8 G/DL — SIGNIFICANT CHANGE UP (ref 6–8.3)
PROT UR-MCNC: NEGATIVE MG/DL — SIGNIFICANT CHANGE UP
RBC # BLD: 4.07 M/UL — SIGNIFICANT CHANGE UP (ref 3.8–5.2)
RBC # FLD: 12 % — SIGNIFICANT CHANGE UP (ref 10.3–14.5)
SODIUM SERPL-SCNC: 139 MMOL/L — SIGNIFICANT CHANGE UP (ref 135–145)
SP GR SPEC: 1.01 — SIGNIFICANT CHANGE UP (ref 1–1.03)
UROBILINOGEN FLD QL: 1 MG/DL — SIGNIFICANT CHANGE UP (ref 0.2–1)
WBC # BLD: 8.37 K/UL — SIGNIFICANT CHANGE UP (ref 3.8–10.5)
WBC # FLD AUTO: 8.37 K/UL — SIGNIFICANT CHANGE UP (ref 3.8–10.5)

## 2024-12-23 PROCEDURE — 85025 COMPLETE CBC W/AUTO DIFF WBC: CPT

## 2024-12-23 PROCEDURE — 87086 URINE CULTURE/COLONY COUNT: CPT

## 2024-12-23 PROCEDURE — 99284 EMERGENCY DEPT VISIT MOD MDM: CPT | Mod: 25

## 2024-12-23 PROCEDURE — 81001 URINALYSIS AUTO W/SCOPE: CPT

## 2024-12-23 PROCEDURE — 99285 EMERGENCY DEPT VISIT HI MDM: CPT

## 2024-12-23 PROCEDURE — 96374 THER/PROPH/DIAG INJ IV PUSH: CPT | Mod: XU

## 2024-12-23 PROCEDURE — 36415 COLL VENOUS BLD VENIPUNCTURE: CPT

## 2024-12-23 PROCEDURE — 71275 CT ANGIOGRAPHY CHEST: CPT | Mod: MC

## 2024-12-23 PROCEDURE — 71275 CT ANGIOGRAPHY CHEST: CPT | Mod: 26,MC

## 2024-12-23 PROCEDURE — 74177 CT ABD & PELVIS W/CONTRAST: CPT | Mod: MC

## 2024-12-23 PROCEDURE — 83690 ASSAY OF LIPASE: CPT

## 2024-12-23 PROCEDURE — 80053 COMPREHEN METABOLIC PANEL: CPT

## 2024-12-23 PROCEDURE — 74177 CT ABD & PELVIS W/CONTRAST: CPT | Mod: 26,MC

## 2024-12-23 RX ORDER — IOHEXOL 300 MG/ML
30 INJECTION, SOLUTION INTRAVENOUS ONCE
Refills: 0 | Status: COMPLETED | OUTPATIENT
Start: 2024-12-23 | End: 2024-12-23

## 2024-12-23 RX ORDER — CYCLOBENZAPRINE HCL 10 MG
10 TABLET ORAL ONCE
Refills: 0 | Status: COMPLETED | OUTPATIENT
Start: 2024-12-23 | End: 2024-12-23

## 2024-12-23 RX ORDER — KETOROLAC TROMETHAMINE 30 MG/ML
15 INJECTION INTRAMUSCULAR; INTRAVENOUS ONCE
Refills: 0 | Status: DISCONTINUED | OUTPATIENT
Start: 2024-12-23 | End: 2024-12-23

## 2024-12-23 RX ORDER — SODIUM CHLORIDE 9 MG/ML
1000 INJECTION, SOLUTION INTRAMUSCULAR; INTRAVENOUS; SUBCUTANEOUS ONCE
Refills: 0 | Status: COMPLETED | OUTPATIENT
Start: 2024-12-23 | End: 2024-12-23

## 2024-12-23 RX ORDER — DIAZEPAM 10 MG/1
1 TABLET ORAL
Qty: 9 | Refills: 0
Start: 2024-12-23 | End: 2024-12-25

## 2024-12-23 RX ORDER — SPIRONOLACTONE 25 MG
1 TABLET ORAL
Refills: 0 | DISCHARGE

## 2024-12-23 RX ADMIN — Medication 4 MILLIGRAM(S): at 21:19

## 2024-12-23 RX ADMIN — Medication 4 MILLIGRAM(S): at 15:49

## 2024-12-23 RX ADMIN — IOHEXOL 30 MILLILITER(S): 300 INJECTION, SOLUTION INTRAVENOUS at 15:48

## 2024-12-23 RX ADMIN — Medication 10 MILLIGRAM(S): at 21:00

## 2024-12-23 RX ADMIN — SODIUM CHLORIDE 1000 MILLILITER(S): 9 INJECTION, SOLUTION INTRAMUSCULAR; INTRAVENOUS; SUBCUTANEOUS at 15:48

## 2024-12-23 NOTE — ED PROVIDER NOTE - NSFOLLOWUPINSTRUCTIONS_ED_ALL_ED_FT
It is unclear what exactly is causing your symptoms. It may be related to colitis. It is important to continue following up with your doctor outside the hospital and to return to ER for: Persistent fever/vomiting, uncontrolled pain, worsening swelling, worsening breathing, worsening lightheaded, spreading redness.     Can take valium as prescribed (do not combine with oxycodone).     Follow up with your surgeon as soon as possible.     Can take tylenol 650mg every 6hours as needed for mild pain or fever.      Colitis    Colitis is inflammation of the colon. Colitis may last a short time (acute) or it may last a long time (chronic).    What are the causes?  This condition may be caused by:  Viruses.  Bacteria.  Reactions to medicine.  Certain autoimmune diseases, such as Crohn disease or ulcerative colitis.    What are the signs or symptoms?  Symptoms of this condition include:  Diarrhea.  Passing bloody or tarry stool.  Pain.  Fever.  Vomiting.  Tiredness (fatigue).  Weight loss.  Bloating.  Sudden increase in abdominal pain.  Having fewer bowel movements than usual.    How is this diagnosed?  This condition is diagnosed with a stool test or a blood test. You may also have other tests, including X-rays, a CT scan, or a colonoscopy.    How is this treated?  Treatment may include:  Resting the bowel. This involves not eating or drinking for a period of time.  Fluids that are given through an IV tube.  Medicine for pain and diarrhea.  Antibiotic medicines.  Cortisone medicines.  Surgery.    Follow these instructions at home:  Eating and drinking     Follow instructions from your health care provider about eating or drinking restrictions.  Drink enough fluid to keep your urine clear or pale yellow.  Work with a dietitian to determine which foods cause your condition to flare up.  Avoid foods that cause flare-ups.  Eat a well-balanced diet.    Medicines     Take over-the-counter and prescription medicines only as told by your health care provider.  If you were prescribed an antibiotic medicine, take it as told by your health care provider. Do not stop taking the antibiotic even if you start to feel better.  General instructions     Keep all follow-up visits as told by your health care provider. This is important.  Contact a health care provider if:  Your symptoms do not go away.  You develop new symptoms.  Get help right away if:  You have a fever that does not go away with treatment.  You develop chills.  You have extreme weakness, fainting, or dehydration.  You have repeated vomiting.  You develop severe pain in your abdomen.  You pass bloody or tarry stool.

## 2024-12-23 NOTE — ED PROVIDER NOTE - OBJECTIVE STATEMENT
40 y/o f s/p cholecystectomy with Dr. Jenkins 12/17/24 presents c/o persistent right upper abd pain over the past 6 days.  Pt stating she has taken oxycodone for pain a few times which she states doesn't help much but makes her drowsy.  Pt also having pain which radiates into her right chest with a deep breath.  Pt spoke with Dr. Jenkins's office today and was advised to come to ED.  Denies fever, chills, vomiting, diarrhea, urinary sx, all other ROS negative.

## 2024-12-23 NOTE — ED PROVIDER NOTE - ATTENDING APP SHARED VISIT CONTRIBUTION OF CARE
Vitals wnl, exam as above.   ALT 71, other labs grossly wnl.   UA w/ moderate blood, small leuk esterase, 48 WBCs, many bacteria, 6 epithelial cells. Has no urinary symptoms.   CTs (surgery had requested CTA chest as well) showed "IMPRESSION:  1.  Moderate right hydroureteronephrosis with abrupt tapering at the proximal right ureteric segment, likely due to focal stricturing. However, there are sub-5 mm calculi near the right ureterovesical   junction. Urology evaluation is advised. 2.  Focal right segmental colitis which may be infectious/inflammatory in nature. Other etiologies are not completely excluded. 3.  Moderate constipation.  4.  Postoperative changes in the gallbladder fossa with small fluid and gas extending to the perihepatic spaces. 5.  No evidence of pulmonary embolism. 6.  No acute cardiopulmonary disease detected."  Discussed all CT results w/ pt. Pt states that she has hx of "swollen kidney" and seemingly also ureteral stenosis since birth. Pain is constant and located RUQ and not c/w renal colic -  CT findings unlikely to be cause of pt's symptoms.   Rediscussed w/ surgery, awaiting recs. Vitals wnl, exam as above.   ALT 71, other labs grossly wnl.   UA w/ moderate blood, small leuk esterase, 48 WBCs, many bacteria, 6 epithelial cells. Has no urinary symptoms. Will hold tx pending urine cx.   CTs (surgery had requested CTA chest as well) showed "IMPRESSION:  1.  Moderate right hydroureteronephrosis with abrupt tapering at the proximal right ureteric segment, likely due to focal stricturing. However, there are sub-5 mm calculi near the right ureterovesical   junction. Urology evaluation is advised. 2.  Focal right segmental colitis which may be infectious/inflammatory in nature. Other etiologies are not completely excluded. 3.  Moderate constipation.  4.  Postoperative changes in the gallbladder fossa with small fluid and gas extending to the perihepatic spaces. 5.  No evidence of pulmonary embolism. 6.  No acute cardiopulmonary disease detected."  Discussed all CT results w/ pt. Pt states that she has hx of "swollen kidney" and seemingly also ureteral stenosis since birth. Pain is constant and located RUQ and not c/w renal colic -  CT findings unlikely to be cause of pt's symptoms.   Rediscussed w/ surgery, awaiting recs.

## 2024-12-23 NOTE — ED PROVIDER NOTE - CLINICAL SUMMARY MEDICAL DECISION MAKING FREE TEXT BOX
40 y/o f presents s/p cholecystectomy 12/17/24 c/o persistent upper abd pain.  Pt afebrile in ED, has diffuse tenderness on exam, labs with no acute abnormalities.  Surgery consulted and evaluated pt in ED, recommend CT a/p along with CTA chest which was ordered.  Will f/u results and reassess.

## 2024-12-23 NOTE — ED ADULT NURSE NOTE - OBJECTIVE STATEMENT
39 y.o female A&Ox4 presents to ED w/ c/o RUQ abdominal pain, s/p cholecystectomy last Tuesday (12/17/24). Pt reports pain as 5/10 when sitting and 7/10 when ambulating. Pt denies f/c, n/v/d. Pt speaking in clear, full sentences, respirations even and unlabored.

## 2024-12-23 NOTE — ED PROVIDER NOTE - PROGRESS NOTE DETAILS
Klep[place]: Pt requesting muscle relaxant for possible MSK component of her pain. flexeril pending.   Re-evaluated by surgery - had offered admission, pt thinking about it.  Pending: Surgery recs. [place]: rediscussed w/ surgery at pt's bedside, pt prefers outpt f/u. also requesting valium. Discussed importance of outpt follow up and return precautions. Clinically no indication for further emergent ED workup or hospitalization at this time. Stable for dc, outpt f/u.

## 2024-12-23 NOTE — ED PROVIDER NOTE - PATIENT PORTAL LINK FT
You can access the FollowMyHealth Patient Portal offered by [facility] by registering at the following website: [email or web address]. By joining [email or web address]’s FollowMyHealth portal, you will also be able to view your health information using other applications (apps) compatible with our system.

## 2024-12-23 NOTE — ED ADULT TRIAGE NOTE - CHIEF COMPLAINT QUOTE
Pt presents to ED here post op complication, pt reports s/p cholecystectomy last week Tuesday. Pt A&Ox4, NAD. Denies fever, chills, n/v/d.

## 2024-12-23 NOTE — CONSULT NOTE ADULT - ASSESSMENT
40yo Female pt with PMHx of GERD, R hydronephrosis, and symptomatic cholelithiasis and now s/p RA cholecystectomy with umbilical hernia repair on 12/17 with Dr. Jenkins required admission post-op for pain control. Pt presnts to the ED today after being evaluated by Dr. Arriaga in the clinic, pt presents with abdominal pain and difficulty breathing. Labs unremarkable with no leucocytosis no transaminitis with mild elevation in ALT, CT did not show any acute post-surgical findings, no PE and known hydronephrosis noted. Overall, in the ED, the patient was afebrile, HD stable and saturating well on room air, pain in the ED was well controlled and not worsening, she did not have any nausea. Discussed the CT findings and patient would prefer to proceed with monitoring pain. Pt was advised on return parameters and has a follow up appointment with Dr. Jenkins next week    PLAN  No acute surgical intervention needed  Patient prefers to go home with no further intervention at this time time   Spoke to pt at length about pain control and options, option of trying muscle relaxant and additional adjuncts such as cold or hot packs. Patient would like to try Valium and continue round the clock Tylenol and Advil for pain, pt was in agreement and satisfied with the plan   Outpatient follow up with Dr. Jenkins

## 2024-12-26 DIAGNOSIS — K21.9 GASTRO-ESOPHAGEAL REFLUX DISEASE WITHOUT ESOPHAGITIS: ICD-10-CM

## 2024-12-26 DIAGNOSIS — R10.9 UNSPECIFIED ABDOMINAL PAIN: ICD-10-CM

## 2024-12-26 DIAGNOSIS — G89.18 OTHER ACUTE POSTPROCEDURAL PAIN: ICD-10-CM

## 2024-12-26 DIAGNOSIS — Z90.49 ACQUIRED ABSENCE OF OTHER SPECIFIED PARTS OF DIGESTIVE TRACT: ICD-10-CM
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
## 2024-12-30 ENCOUNTER — APPOINTMENT (OUTPATIENT)
Dept: SURGERY | Facility: CLINIC | Age: 39
End: 2024-12-30
Payer: COMMERCIAL

## 2024-12-30 VITALS
SYSTOLIC BLOOD PRESSURE: 106 MMHG | OXYGEN SATURATION: 98 % | WEIGHT: 130 LBS | BODY MASS INDEX: 20.89 KG/M2 | HEIGHT: 66 IN | TEMPERATURE: 98.1 F | DIASTOLIC BLOOD PRESSURE: 75 MMHG | HEART RATE: 66 BPM

## 2024-12-30 DIAGNOSIS — Z86.59 PERSONAL HISTORY OF OTHER MENTAL AND BEHAVIORAL DISORDERS: ICD-10-CM

## 2024-12-30 DIAGNOSIS — K80.10 CALCULUS OF GALLBLADDER WITH CHRONIC CHOLECYSTITIS W/OUT OBSTRUCTION: ICD-10-CM

## 2024-12-30 DIAGNOSIS — K21.9 GASTRO-ESOPHAGEAL REFLUX DISEASE W/OUT ESOPHAGITIS: ICD-10-CM

## 2024-12-30 DIAGNOSIS — N13.30 UNSPECIFIED HYDRONEPHROSIS: ICD-10-CM

## 2024-12-30 DIAGNOSIS — K42.9 UMBILICAL HERNIA W/OUT OBSTRUCTION OR GANGRENE: ICD-10-CM

## 2024-12-30 PROCEDURE — G2211 COMPLEX E/M VISIT ADD ON: CPT | Mod: NC

## 2024-12-30 PROCEDURE — 99024 POSTOP FOLLOW-UP VISIT: CPT

## 2024-12-30 RX ORDER — DIAZEPAM 2 MG/1
2 TABLET ORAL
Qty: 9 | Refills: 0 | Status: ACTIVE | COMMUNITY
Start: 2024-12-23

## (undated) DEVICE — XI ARM FORCEP FENESTRATED BIPOLAR 8MM

## (undated) DEVICE — ELCTR BOVIE PENCIL HANDPIECE ROCKER SWITCH 15FT

## (undated) DEVICE — SUT VICRYL 2-0 27" SH

## (undated) DEVICE — DRAPE TOP SHEET 53" X 101"

## (undated) DEVICE — ENDOCATCH 10MM

## (undated) DEVICE — TROCAR COVIDIEN VERSAONE BLUNT TIP HASSAN 12MM

## (undated) DEVICE — XI OBTURATOR OPTICAL BLADELESS 8MM

## (undated) DEVICE — PACK GENERAL LAPAROSCOPY

## (undated) DEVICE — XI DRAPE ARM

## (undated) DEVICE — SUT VICRYL 0 27" UR-6

## (undated) DEVICE — MARKING PEN W RULER

## (undated) DEVICE — SOL IRR BAG NS 0.9% 3000ML

## (undated) DEVICE — GLV 7 PROTEXIS (WHITE)

## (undated) DEVICE — XI ARM FORCEP CADIERE 8MM

## (undated) DEVICE — SUT MAXON 0 30" HGU-46

## (undated) DEVICE — XI CANNULA SEAL 5MM - 8 MM

## (undated) DEVICE — WARMING BLANKET LOWER ADULT

## (undated) DEVICE — XI ARM PERMANENT CAUTERY HOOK

## (undated) DEVICE — XI CLIP APPLIER ARM MEDIUM-LARGE

## (undated) DEVICE — D HELP - CLEARVIEW CLEARIFY SYSTEM

## (undated) DEVICE — Device

## (undated) DEVICE — VENODYNE/SCD SLEEVE CALF MEDIUM

## (undated) DEVICE — GOWN ROYAL SILK XL

## (undated) DEVICE — SUT MONOCRYL 4-0 27" PS-2 UNDYED

## (undated) DEVICE — GLV 6.5 PROTEXIS (WHITE)

## (undated) DEVICE — XI TIP COVER

## (undated) DEVICE — SUT VICRYL 2-0 27" UR-6

## (undated) DEVICE — ELCTR GROUNDING PAD ADULT COVIDIEN